# Patient Record
Sex: MALE | Race: BLACK OR AFRICAN AMERICAN | NOT HISPANIC OR LATINO | URBAN - METROPOLITAN AREA
[De-identification: names, ages, dates, MRNs, and addresses within clinical notes are randomized per-mention and may not be internally consistent; named-entity substitution may affect disease eponyms.]

---

## 2020-10-02 ENCOUNTER — EMERGENCY (EMERGENCY)
Facility: HOSPITAL | Age: 20
LOS: 1 days | Discharge: HOME | End: 2020-10-02
Attending: EMERGENCY MEDICINE | Admitting: EMERGENCY MEDICINE
Payer: OTHER MISCELLANEOUS

## 2020-10-02 VITALS
HEART RATE: 52 BPM | TEMPERATURE: 98 F | WEIGHT: 149.91 LBS | RESPIRATION RATE: 18 BRPM | OXYGEN SATURATION: 100 % | DIASTOLIC BLOOD PRESSURE: 79 MMHG | SYSTOLIC BLOOD PRESSURE: 144 MMHG | HEIGHT: 69 IN

## 2020-10-02 VITALS
OXYGEN SATURATION: 100 % | HEART RATE: 64 BPM | TEMPERATURE: 99 F | SYSTOLIC BLOOD PRESSURE: 128 MMHG | RESPIRATION RATE: 17 BRPM | DIASTOLIC BLOOD PRESSURE: 82 MMHG

## 2020-10-02 PROCEDURE — 99284 EMERGENCY DEPT VISIT MOD MDM: CPT | Mod: 25

## 2020-10-02 PROCEDURE — 70450 CT HEAD/BRAIN W/O DYE: CPT | Mod: 26

## 2020-10-02 PROCEDURE — 70450 CT HEAD/BRAIN W/O DYE: CPT

## 2020-10-02 PROCEDURE — 99284 EMERGENCY DEPT VISIT MOD MDM: CPT

## 2020-10-02 RX ORDER — ACETAMINOPHEN 500 MG
650 TABLET ORAL ONCE
Refills: 0 | Status: COMPLETED | OUTPATIENT
Start: 2020-10-02 | End: 2020-10-02

## 2020-10-02 RX ADMIN — Medication 650 MILLIGRAM(S): at 21:00

## 2020-10-02 RX ADMIN — Medication 650 MILLIGRAM(S): at 21:11

## 2020-10-02 NOTE — ED PROVIDER NOTE - CARE PLAN
Principal Discharge DX:	Concussion  Secondary Diagnosis:	Head injury  Secondary Diagnosis:	Contusion

## 2020-10-02 NOTE — ED PROVIDER NOTE - PATIENT PORTAL LINK FT
You can access the FollowMyHealth Patient Portal offered by [facility] by registering at the following website: [email or web address]. By joining [facility]’s FollowMyHealth portal, you will also be able to view your health information using other applications (apps) compatible with our system.

## 2020-10-02 NOTE — ED PROVIDER NOTE - NSFOLLOWUPINSTRUCTIONS_ED_ALL_ED_FT
Apply ice to area. Follow up with neurology in 2-3 days. Return to ED for worsening condition.     Concussion    WHAT YOU NEED TO KNOW:    What is a concussion? A concussion is a mild brain injury. It is usually caused by a bump or blow to the head from a fall, a motor vehicle crash, or a sports injury. Being shaken forcefully may also cause a concussion.    What are the signs and symptoms of a concussion? Symptoms may occur right away, or they may appear days after the concussion:  •A mild to moderate headache      •Dizziness, loss of balance, or blurry vision      •Nausea or vomiting      •A change in mood, such as restlessness or irritability      •Trouble thinking, remembering things, or concentrating      •Ringing in the ears      •Drowsiness or decreased energy      •Changes in your normal sleeping pattern      How is a concussion diagnosed? Your healthcare provider will ask how you were injured, and about your symptoms. He or she will also examine you. You may need any of the following:   •A neurologic exam is also called neuro signs, neuro checks, or neuro status. A neurologic exam can show healthcare providers how well your brain works after your injury. Healthcare providers will check how your pupils react to light. They may check your memory and how easily you wake up. Your hand grasp and balance may also be tested.      •CT or MRI pictures may be taken of your head. You may be given contrast liquid to help the pictures show up better. Tell the healthcare provider if you have ever had an allergic reaction to contrast liquid. Do not enter the MRI room with anything metal. Metal can cause serious injury. Tell the healthcare provider if you have any metal in or on your body.      How is a concussion managed? Usually no treatment is needed for a mild concussion. Concussion symptoms usually go away within about 10 days, but they may last longer. The following may be recommended to manage your symptoms:   •Rest from physical and mental activities as directed. Mental activities are those that require thinking, concentration, and attention. You will need to rest until your symptoms are gone. Rest will allow you to recover from your concussion. Ask your healthcare provider when you can return to work and other daily activities.      •Have someone stay with you for the first 24 hours after your injury. Your healthcare provider should be contacted if your symptoms get worse, or you develop new symptoms.      •Do not participate in sports and physical activities until your healthcare provider says it is okay. They could make your symptoms worse or lead to another concussion. Your healthcare provider will tell you when it is okay for you to return to sports or physical activities. Ask for more information about sports concussions.      •Acetaminophen decreases pain and fever. It is available without a doctor's order. Ask how much to take and how often to take it. Follow directions. Read the labels of all other medicines you are using to see if they also contain acetaminophen, or ask your doctor or pharmacist. Acetaminophen can cause liver damage if not taken correctly. Do not use more than 4 grams (4,000 milligrams) total of acetaminophen in one day.       •NSAIDs help decrease swelling and pain or fever. This medicine is available with or without a doctor's order. NSAIDs can cause stomach bleeding or kidney problems in certain people. If you take blood thinner medicine, always ask your healthcare provider if NSAIDs are safe for you. Always read the medicine label and follow directions.      How can I help prevent another concussion?   •Wear protective sports equipment that fits properly. Helmets help decrease your risk for a serious brain injury. Talk to your healthcare provider about ways you can decrease your risk for a concussion if you play sports.      •Wear your seatbelt every time you travel. This helps to decrease your risk for a head injury if you are in a car accident.       Have someone call 911 for any of the following:   •You cannot be woken.      •You have a seizure, increasing confusion, or a change in personality.      •Your speech becomes slurred.      When should I seek immediate care?   •You have sudden and new vision problems.      •You have a severe headache that does not go away.      •You have arm or leg weakness, numbness, or new problems with coordination.      •You have blood or clear fluid coming out of the ears or nose.      When should I contact my healthcare provider?   •You have nausea or are vomiting.      •You feel more sleepy than usual.      •Your symptoms get worse.      •Your symptoms last longer than 6 weeks after the injury.      •You have questions or concerns about your condition or care.      CARE AGREEMENT:    You have the right to help plan your care. Learn about your health condition and how it may be treated. Discuss treatment options with your healthcare providers to decide what care you want to receive. You always have the right to refuse treatment.        © Copyright [employer] 2020           back to top                          © Copyright [employer] 2020          Head Injury    WHAT YOU NEED TO KNOW:    What do I need to know about a head injury? A head injury can include your scalp, face, skull, or brain and range from mild to severe. Effects can appear immediately after the injury or develop later. The effects may last a short time or be permanent. Healthcare providers may want to check your recovery over time. Treatment may change as you recover or develop new health problems from the head injury.    What are the signs and symptoms of a head injury?   •An open scalp or skin wound, swelling, or bruising      •Mild to moderate headache      •Dizziness or loss of balance      •Nausea or vomiting      •Ringing in the ears or neck pain      •Confusion, especially right after the injury      •Change in mood, such as feeling restless or irritable      •Trouble thinking, remembering, or concentrating      •Drowsiness or decreased amount of energy      •Trouble sleeping      How is a head injury diagnosed?   •Tell your healthcare provider about your injury and symptoms. The provider will do an exam to check your brain function. He or she will check how your pupils react to light. He or she will check your memory, hand grasp, and balance.      •You may need x-rays, a CT scan, or an MRI to check for bleeding or major damage to your skull or brain. You may be given contrast liquid to help the pictures show up better. Tell the healthcare provider if you have ever had an allergic reaction to contrast liquid. Do not enter the MRI room with anything metal. Metal can cause serious injury. Tell the provider if you have any metal in or on your body.      How is a head injury treated? A mild head injury may not need to be treated. You may be given medicine to decrease pain. Other treatments may depend on how severe your head injury is. A concussion, hematoma (collection of blood), or traumatic brain injury may need both immediate and long-term treatment.    How can I manage my symptoms?   •Rest or do quiet activities. Limit your time watching TV, using the computer, or doing tasks that require a lot of thinking. Slowly return to your normal activities as directed. Do not play sports or do activities that may cause you to get hit in the head. Ask your healthcare provider when you can return to sports.      •Apply ice on your head for 15 to 20 minutes every hour or as directed. Use an ice pack, or put crushed ice in a plastic bag. Cover it with a towel before you apply it to your skin. Ice helps prevent tissue damage and decreases swelling and pain.      •Have someone stay with you for 24 hours , or as directed. This person can monitor you for problems and call for help if needed. When you are awake, the person should ask you a few questions every few hours to see if you are thinking clearly. An example is to ask your name or address.      What can I do to prevent another head injury?   •Wear a helmet that fits properly. Do this when you play sports, or ride a bike, scooter, or skateboard. Helmets help decrease your risk for a serious head injury. Talk to your healthcare provider about other ways you can protect yourself if you play sports.      •Wear your seatbelt every time you are in a car. This helps lower your risk for a head injury if you are in a car accident.      Call your local emergency number (911 in the ), or have someone else call if:   •You cannot be woken.      •You have a seizure.      •You stop responding to others or you faint.      •You have blurry or double vision.      •Your speech becomes slurred or confused.      •You have arm or leg weakness, loss of feeling, or new problems with coordination.      •Your pupils are larger than usual, or one pupil is a different size than the other.      •You have blood or clear fluid coming out of your ears or nose.      When should I seek immediate care?   •You have repeated or forceful vomiting.      •You feel confused.      •Your headache gets worse or becomes severe.      •You or someone caring for you notices that you are harder to wake than usual.      When should I call my doctor?   •Your symptoms last longer than 6 weeks after the injury.      •You have questions or concerns about your condition or care.      CARE AGREEMENT:    You have the right to help plan your care. Learn about your health condition and how it may be treated. Discuss treatment options with your healthcare providers to decide what care you want to receive. You always have the right to refuse treatment.        © Copyright [employer] 2020           back to top                          © Copyright [employer] 2020

## 2020-10-02 NOTE — ED ADULT NURSE REASSESSMENT NOTE - NS ED NURSE REASSESS COMMENT FT1
report was received from off-going RN, nad at present, mother at bedside, awaiting provider eval., assessment on-going
Dispo received as noted, Dr. Mayers at bedside discussing dispo. with pt. and family member
pt. returned from CT. medicated for pain as noted, awaiting CT results, assessment on-going

## 2020-10-02 NOTE — ED PROVIDER NOTE - OBJECTIVE STATEMENT
21 y/o M with no PMHx comes in for head injury this evening. Patient was at work and picking up cup that fell, when standing hit head on shelf to L frontal area. No LOC but with intense pain to area, feels dizzy, no vision changes, no numbness or tingling.

## 2020-10-02 NOTE — ED ADULT NURSE NOTE - NSIMPLEMENTINTERV_GEN_ALL_ED
Implemented All Fall Risk Interventions:  [place] to call system. Call bell, personal items and telephone within reach. Instruct patient to call for assistance. Room bathroom lighting operational. Non-slip footwear when patient is off stretcher. Physically safe environment: no spills, clutter or unnecessary equipment. Stretcher in lowest position, wheels locked, appropriate side rails in place. Provide visual cue, wrist band, yellow gown, etc. Monitor gait and stability. Monitor for mental status changes and reorient to person, place, and time. Review medications for side effects contributing to fall risk. Reinforce activity limits and safety measures with patient and family.

## 2020-10-02 NOTE — ED PROVIDER NOTE - PHYSICAL EXAMINATION
VITAL SIGNS: I have reviewed nursing notes and confirm.  CONSTITUTIONAL: Well-developed; well-nourished; in no acute distress.   SKIN:  warm and dry, no acute rash.   HEAD:  Tenderness to L frontal aspect of forehead, no laceration, no crepitus, no deformity, normocephalic, atraumatic.  EYES: EOM intact; conjunctiva and sclera clear.  ENT: No nasal discharge; airway clear.   NECK: Supple; non tender.  CARD: S1, S2 normal; no murmurs, gallops, or rubs. Regular rate and rhythm.   RESP:  Clear to auscultation b/l, no wheezes, rales or rhonchi.  ABD: Normal bowel sounds; soft; non-distended; non-tender; no guarding/ rebound.  EXT: Normal ROM. No clubbing, cyanosis or edema. 2+ pulses to b/l ue/le.  NEURO: Alert, oriented, grossly unremarkable. No focal findings, PEERLA  PSYCH: Cooperative, mood and affect appropriate.

## 2020-10-02 NOTE — ED ADULT NURSE NOTE - CHPI ED NUR SYMPTOMS NEG
no numbness/no weakness/no loss of consciousness/no nausea/no change in level of consciousness/no confusion/no vomiting/no fever/no blurred vision

## 2020-10-02 NOTE — ED ADULT NURSE NOTE - OBJECTIVE STATEMENT
Pt presents reporting he stood up under a metal pipe and hit struck his left temple. Pt was denies LOC, denies nausea/vomiting. Pt first presented to  who called 911 on arrival. Per EMS pt was too dizzy to walk, pt presents too dizzy to stand, wearing sunglasses and reporting photophobia. NIH 0 on presentation. Pt denies pmhx, denies daily meds. Left temple tender to touch, no ecchymosis present at time of interview. Incident occurred at work.

## 2020-10-02 NOTE — ED ADULT TRIAGE NOTE - OTHER COMPLAINTS
Patient reports he hit his head on metal today. Patient complaining of headache and dizziness after injury. EMS reports patient has unsteady gait.

## 2020-10-06 DIAGNOSIS — S09.90XA UNSPECIFIED INJURY OF HEAD, INITIAL ENCOUNTER: ICD-10-CM
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
## 2020-10-06 DIAGNOSIS — S06.0X0A CONCUSSION WITHOUT LOSS OF CONSCIOUSNESS, INITIAL ENCOUNTER: ICD-10-CM
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
## 2020-10-06 DIAGNOSIS — S00.83XA CONTUSION OF OTHER PART OF HEAD, INITIAL ENCOUNTER: ICD-10-CM
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
## 2023-09-11 NOTE — ED ADULT NURSE NOTE - NURSING MUSC JOINTS
Patient has hypokalemia which is Acute and currently controlled. Most recent potassium levels reviewed-   Lab Results   Component Value Date    K 3.3 (L) 09/11/2023   . Will continue potassium replacement per protocol and recheck repeat levels after replacement completed.        no pain, swelling or deformity of joints
